# Patient Record
Sex: FEMALE | Race: WHITE | NOT HISPANIC OR LATINO | Employment: STUDENT | URBAN - METROPOLITAN AREA
[De-identification: names, ages, dates, MRNs, and addresses within clinical notes are randomized per-mention and may not be internally consistent; named-entity substitution may affect disease eponyms.]

---
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
## 2023-01-06 ENCOUNTER — PATIENT MESSAGE (OUTPATIENT)
Dept: OBSTETRICS AND GYNECOLOGY | Facility: CLINIC | Age: 18
End: 2023-01-06
Payer: COMMERCIAL

## 2023-01-06 RX ORDER — FLUCONAZOLE 150 MG/1
150 TABLET ORAL DAILY
Qty: 1 TABLET | Refills: 1 | Status: SHIPPED | OUTPATIENT
Start: 2023-01-06 | End: 2023-01-08

## 2024-02-19 RX ORDER — NORETHINDRONE ACETATE AND ETHINYL ESTRADIOL 1MG-20(21)
1 KIT ORAL
Qty: 84 TABLET | Refills: 0 | Status: SHIPPED | OUTPATIENT
Start: 2024-02-19 | End: 2024-05-14

## 2024-02-19 NOTE — TELEPHONE ENCOUNTER
Refill Routing Note   Medication(s) are not appropriate for processing by [facility] for the following reason(s):        Patient not seen by provider within 15 months    [facility] action(s):  Defer               Appointments  past 12m or future 3m with PCP    Date Provider   Last Visit   11/21/2022 Jerrica Martinez MD   Next Visit   Visit date not found Jerrica Martinez MD   ED visits in past 90 days: 0        Note composed:9:58 AM 02/19/2024

## 2024-05-14 RX ORDER — NORETHINDRONE ACETATE AND ETHINYL ESTRADIOL 1MG-20(21)
1 KIT ORAL
Qty: 84 TABLET | Refills: 0 | Status: SHIPPED | OUTPATIENT
Start: 2024-05-14

## 2024-05-14 NOTE — TELEPHONE ENCOUNTER
Refill Routing Note   Medication(s) are not appropriate for processing by [facility] for the following reason(s):        Patient not seen by provider within 15 months    [facility] action(s):  Defer               Appointments  past 12m or future 3m with PCP    Date Provider   Last Visit   11/21/2022 Jerrica Martinez MD   Next Visit   Visit date not found Jerrica Martinez MD   ED visits in past 90 days: 0        Note composed:8:15 AM 05/14/2024

## 2024-05-27 ENCOUNTER — LAB VISIT (OUTPATIENT)
Dept: LAB | Facility: HOSPITAL | Age: 19
End: 2024-05-27
Attending: INTERNAL MEDICINE
Payer: COMMERCIAL

## 2024-05-27 ENCOUNTER — OFFICE VISIT (OUTPATIENT)
Dept: RHEUMATOLOGY | Facility: CLINIC | Age: 19
End: 2024-05-27
Payer: COMMERCIAL

## 2024-05-27 VITALS
DIASTOLIC BLOOD PRESSURE: 77 MMHG | HEART RATE: 70 BPM | SYSTOLIC BLOOD PRESSURE: 119 MMHG | WEIGHT: 111.31 LBS | BODY MASS INDEX: 21.04 KG/M2

## 2024-05-27 DIAGNOSIS — M79.10 MYALGIA: Primary | ICD-10-CM

## 2024-05-27 DIAGNOSIS — M79.10 MYALGIA: ICD-10-CM

## 2024-05-27 LAB
CK SERPL-CCNC: 386 U/L (ref 20–180)
CRP SERPL-MCNC: <0.3 MG/L (ref 0–8.2)
ERYTHROCYTE [SEDIMENTATION RATE] IN BLOOD BY PHOTOMETRIC METHOD: 11 MM/HR (ref 0–36)

## 2024-05-27 PROCEDURE — 3074F SYST BP LT 130 MM HG: CPT | Mod: CPTII,S$GLB,, | Performed by: INTERNAL MEDICINE

## 2024-05-27 PROCEDURE — 3008F BODY MASS INDEX DOCD: CPT | Mod: CPTII,S$GLB,, | Performed by: INTERNAL MEDICINE

## 2024-05-27 PROCEDURE — 36415 COLL VENOUS BLD VENIPUNCTURE: CPT | Performed by: INTERNAL MEDICINE

## 2024-05-27 PROCEDURE — 1159F MED LIST DOCD IN RCRD: CPT | Mod: CPTII,S$GLB,, | Performed by: INTERNAL MEDICINE

## 2024-05-27 PROCEDURE — 1160F RVW MEDS BY RX/DR IN RCRD: CPT | Mod: CPTII,S$GLB,, | Performed by: INTERNAL MEDICINE

## 2024-05-27 PROCEDURE — 82085 ASSAY OF ALDOLASE: CPT | Performed by: INTERNAL MEDICINE

## 2024-05-27 PROCEDURE — 3078F DIAST BP <80 MM HG: CPT | Mod: CPTII,S$GLB,, | Performed by: INTERNAL MEDICINE

## 2024-05-27 PROCEDURE — 86140 C-REACTIVE PROTEIN: CPT | Performed by: INTERNAL MEDICINE

## 2024-05-27 PROCEDURE — 82550 ASSAY OF CK (CPK): CPT | Performed by: INTERNAL MEDICINE

## 2024-05-27 PROCEDURE — 85652 RBC SED RATE AUTOMATED: CPT | Performed by: INTERNAL MEDICINE

## 2024-05-27 PROCEDURE — 99204 OFFICE O/P NEW MOD 45 MIN: CPT | Mod: S$GLB,,, | Performed by: INTERNAL MEDICINE

## 2024-05-27 PROCEDURE — 99999 PR PBB SHADOW E&M-EST. PATIENT-LVL III: CPT | Mod: PBBFAC,,, | Performed by: INTERNAL MEDICINE

## 2024-05-27 RX ORDER — DICLOFENAC SODIUM 10 MG/G
GEL TOPICAL
COMMUNITY
Start: 2024-01-30

## 2024-05-27 RX ORDER — MELOXICAM 15 MG/1
15 TABLET ORAL
COMMUNITY
Start: 2024-01-30

## 2024-05-28 ENCOUNTER — PATIENT MESSAGE (OUTPATIENT)
Dept: RHEUMATOLOGY | Facility: CLINIC | Age: 19
End: 2024-05-28
Payer: COMMERCIAL

## 2024-05-28 NOTE — PROGRESS NOTES
History of present illness:  18-year-old female accompanied by her mother.  She is involved in competitive cheerleading.  She sustained a wrists injury in September.  She had an MRI in March which showed tenosynovitis of the extensor pollicis longus.  She had no evidence of tendon rupture.  She had been treated symptomatically.  Her wrist is doing better.    She went back to training 2 weeks ago.  She complains of muscle soreness since then.  She gets occasional soreness after practice but nothing like this.  It was 1 of her harder practices.  The pain remain bad for a week.  It is starting to wear off but it is still there.  She had no antecedent URI or vaccine.  The pain is bad in the morning.  It is worse with activity.  It occasionally wakes her up at night.  She still continues to train.  She denies any muscle weakness.  She has no Coca-Cola colored urine.  Other chair L have had episodes of rhabdo during the summer.    She also has a 5 year history of neck and back problems.  The pain is intermittent.  The pain is bad in the morning.  She has 30 minutes of morning stiffness.  She has no radicular pain.  She has no other peripheral joint complaints.    She has been using heat by soaking in a tub.  Ibuprofen did not help.  Topical medicines give her some relief but she is sensitive to touch when her mother tries to rub it in.    No fever or headache.  She had an episode of erythema on her eyelid.  She has had no conjunctivitis, oral ulcers, dry eye or mouth, dysphagia, Raynaud's phenomena, pleurisy, vaginal discharge or ulcers, chronic or bloody diarrhea.  She has no thrombophlebitis.  She has no numbness or tingling.  Her grandmother has osteoarthritis in her mother has knee problems.    Systems review:   General:  Weight has been stable   GI: No abdominal pain or peptic ulcer disease.  No liver problems.    : No kidney or bladder problems   Hematology: She had iron-deficiency anemia.  This resolved with iron  supplements.    Physical examination:  Musculoskeletal:  Cervical spine is unremarkable.    She has tenderness along the left shoulder girdle.  She is full range of motion of the shoulders.    Elbows, wrists, small joints of the hands are unremarkable.  She has pain on extension of the lumbar spine.  She is good range of motion lumbar spine.  She has tenderness of the left lateral hip.  She is full range of motion of both hips.  Knees, ankles, small joints the feet are unremarkable.  Neurologic: Normal muscle strength testing    Assessment:  She has a 2 week history of diffuse myalgias.  She has no obvious muscle weakness.  She has a lot of areas of allodynia.    Plans:   1. Laboratory studies obtained   2. If her symptoms persist, she may need to see PM&R   3.  I did not give her regular return appointment.  It depends on the laboratory studies.    Answers submitted by the patient for this visit:  Rheumatology Questionnaire (Submitted on 5/27/2024)  fever: No  eye redness: No  mouth sores: No  headaches: No  shortness of breath: No  chest pain: No  trouble swallowing: No  diarrhea: No  constipation: Yes  unexpected weight change: No  genital sore: No  dysuria: No  During the last 3 days, have you had a skin rash?: Yes  Bruises or bleeds easily: No  cough: No

## 2024-05-30 LAB — ALDOLASE SERPL-CCNC: 4.4 U/L (ref 1.2–7.6)

## 2024-06-07 ENCOUNTER — PATIENT MESSAGE (OUTPATIENT)
Dept: RHEUMATOLOGY | Facility: CLINIC | Age: 19
End: 2024-06-07
Payer: COMMERCIAL

## 2024-06-07 DIAGNOSIS — M79.10 MYALGIA: Primary | ICD-10-CM

## 2024-06-11 ENCOUNTER — LAB VISIT (OUTPATIENT)
Dept: LAB | Facility: HOSPITAL | Age: 19
End: 2024-06-11
Attending: INTERNAL MEDICINE
Payer: COMMERCIAL

## 2024-06-11 ENCOUNTER — PATIENT MESSAGE (OUTPATIENT)
Dept: RHEUMATOLOGY | Facility: CLINIC | Age: 19
End: 2024-06-11
Payer: COMMERCIAL

## 2024-06-11 DIAGNOSIS — M79.10 MYALGIA: ICD-10-CM

## 2024-06-11 LAB — CK SERPL-CCNC: 69 U/L (ref 20–180)

## 2024-06-11 PROCEDURE — 36415 COLL VENOUS BLD VENIPUNCTURE: CPT | Performed by: INTERNAL MEDICINE

## 2024-06-11 PROCEDURE — 82550 ASSAY OF CK (CPK): CPT | Performed by: INTERNAL MEDICINE

## 2024-08-05 ENCOUNTER — OFFICE VISIT (OUTPATIENT)
Dept: OBSTETRICS AND GYNECOLOGY | Facility: CLINIC | Age: 19
End: 2024-08-05
Payer: COMMERCIAL

## 2024-08-05 VITALS — BODY MASS INDEX: 21.97 KG/M2 | WEIGHT: 116.38 LBS | HEIGHT: 61 IN

## 2024-08-05 DIAGNOSIS — Z11.3 SCREENING FOR STD (SEXUALLY TRANSMITTED DISEASE): ICD-10-CM

## 2024-08-05 DIAGNOSIS — Z01.419 ENCOUNTER FOR GYNECOLOGICAL EXAMINATION: Primary | ICD-10-CM

## 2024-08-05 PROCEDURE — 87491 CHLMYD TRACH DNA AMP PROBE: CPT | Performed by: OBSTETRICS & GYNECOLOGY

## 2024-08-05 PROCEDURE — 87591 N.GONORRHOEAE DNA AMP PROB: CPT | Performed by: OBSTETRICS & GYNECOLOGY

## 2024-08-05 PROCEDURE — 3008F BODY MASS INDEX DOCD: CPT | Mod: CPTII,S$GLB,, | Performed by: OBSTETRICS & GYNECOLOGY

## 2024-08-05 PROCEDURE — 99395 PREV VISIT EST AGE 18-39: CPT | Mod: S$GLB,,, | Performed by: OBSTETRICS & GYNECOLOGY

## 2024-08-05 PROCEDURE — 99999 PR PBB SHADOW E&M-EST. PATIENT-LVL III: CPT | Mod: PBBFAC,,, | Performed by: OBSTETRICS & GYNECOLOGY

## 2024-08-05 PROCEDURE — 1159F MED LIST DOCD IN RCRD: CPT | Mod: CPTII,S$GLB,, | Performed by: OBSTETRICS & GYNECOLOGY

## 2024-08-05 PROCEDURE — 99459 PELVIC EXAMINATION: CPT | Mod: S$GLB,,, | Performed by: OBSTETRICS & GYNECOLOGY

## 2024-08-05 RX ORDER — NORETHINDRONE ACETATE AND ETHINYL ESTRADIOL 1MG-20(21)
1 KIT ORAL DAILY
Qty: 84 TABLET | Refills: 3 | Status: SHIPPED | OUTPATIENT
Start: 2024-08-05

## 2024-08-06 LAB
C TRACH DNA SPEC QL NAA+PROBE: DETECTED
N GONORRHOEA DNA SPEC QL NAA+PROBE: NOT DETECTED

## 2024-08-07 ENCOUNTER — PATIENT MESSAGE (OUTPATIENT)
Dept: OBSTETRICS AND GYNECOLOGY | Facility: CLINIC | Age: 19
End: 2024-08-07
Payer: COMMERCIAL

## 2024-08-07 RX ORDER — AZITHROMYCIN 500 MG/1
TABLET, FILM COATED ORAL
Qty: 4 TABLET | Refills: 0 | Status: SHIPPED | OUTPATIENT
Start: 2024-08-07

## 2024-08-09 ENCOUNTER — TELEPHONE (OUTPATIENT)
Dept: OBSTETRICS AND GYNECOLOGY | Facility: CLINIC | Age: 19
End: 2024-08-09
Payer: COMMERCIAL

## 2024-08-12 ENCOUNTER — PATIENT MESSAGE (OUTPATIENT)
Dept: OBSTETRICS AND GYNECOLOGY | Facility: CLINIC | Age: 19
End: 2024-08-12
Payer: COMMERCIAL

## 2024-09-30 ENCOUNTER — PATIENT MESSAGE (OUTPATIENT)
Dept: OBSTETRICS AND GYNECOLOGY | Facility: CLINIC | Age: 19
End: 2024-09-30
Payer: COMMERCIAL

## 2024-09-30 DIAGNOSIS — Z86.19 HISTORY OF CHLAMYDIA: Primary | ICD-10-CM

## 2024-10-04 ENCOUNTER — LAB VISIT (OUTPATIENT)
Dept: LAB | Facility: HOSPITAL | Age: 19
End: 2024-10-04
Attending: OBSTETRICS & GYNECOLOGY
Payer: COMMERCIAL

## 2024-10-04 DIAGNOSIS — Z86.19 HISTORY OF CHLAMYDIA: ICD-10-CM

## 2024-10-04 PROCEDURE — 87591 N.GONORRHOEAE DNA AMP PROB: CPT | Performed by: OBSTETRICS & GYNECOLOGY

## 2024-10-04 PROCEDURE — 87491 CHLMYD TRACH DNA AMP PROBE: CPT | Performed by: OBSTETRICS & GYNECOLOGY

## 2024-10-07 LAB
C TRACH DNA SPEC QL NAA+PROBE: NOT DETECTED
N GONORRHOEA DNA SPEC QL NAA+PROBE: NOT DETECTED

## 2024-10-31 ENCOUNTER — PATIENT MESSAGE (OUTPATIENT)
Dept: RHEUMATOLOGY | Facility: CLINIC | Age: 19
End: 2024-10-31
Payer: COMMERCIAL

## 2024-10-31 DIAGNOSIS — M79.10 MYALGIA: Primary | ICD-10-CM

## 2024-11-01 ENCOUNTER — LAB VISIT (OUTPATIENT)
Dept: LAB | Facility: HOSPITAL | Age: 19
End: 2024-11-01
Attending: INTERNAL MEDICINE
Payer: COMMERCIAL

## 2024-11-01 DIAGNOSIS — M79.10 MYALGIA: ICD-10-CM

## 2024-11-01 LAB
BASOPHILS # BLD AUTO: 0.06 K/UL (ref 0–0.2)
BASOPHILS NFR BLD: 1.2 % (ref 0–1.9)
DIFFERENTIAL METHOD BLD: ABNORMAL
EOSINOPHIL # BLD AUTO: 0.1 K/UL (ref 0–0.5)
EOSINOPHIL NFR BLD: 1.4 % (ref 0–8)
ERYTHROCYTE [DISTWIDTH] IN BLOOD BY AUTOMATED COUNT: 12.4 % (ref 11.5–14.5)
HCT VFR BLD AUTO: 41.9 % (ref 37–48.5)
HGB BLD-MCNC: 13.6 G/DL (ref 12–16)
IMM GRANULOCYTES # BLD AUTO: 0.01 K/UL (ref 0–0.04)
IMM GRANULOCYTES NFR BLD AUTO: 0.2 % (ref 0–0.5)
LYMPHOCYTES # BLD AUTO: 1.5 K/UL (ref 1–4.8)
LYMPHOCYTES NFR BLD: 30.5 % (ref 18–48)
MCH RBC QN AUTO: 31.5 PG (ref 27–31)
MCHC RBC AUTO-ENTMCNC: 32.5 G/DL (ref 32–36)
MCV RBC AUTO: 97 FL (ref 82–98)
MONOCYTES # BLD AUTO: 0.3 K/UL (ref 0.3–1)
MONOCYTES NFR BLD: 6 % (ref 4–15)
NEUTROPHILS # BLD AUTO: 3 K/UL (ref 1.8–7.7)
NEUTROPHILS NFR BLD: 60.7 % (ref 38–73)
NRBC BLD-RTO: 0 /100 WBC
PLATELET # BLD AUTO: 256 K/UL (ref 150–450)
PMV BLD AUTO: 9.6 FL (ref 9.2–12.9)
RBC # BLD AUTO: 4.32 M/UL (ref 4–5.4)
WBC # BLD AUTO: 4.99 K/UL (ref 3.9–12.7)

## 2024-11-01 PROCEDURE — 85025 COMPLETE CBC W/AUTO DIFF WBC: CPT | Performed by: INTERNAL MEDICINE

## 2024-11-01 PROCEDURE — 36415 COLL VENOUS BLD VENIPUNCTURE: CPT | Performed by: INTERNAL MEDICINE

## 2025-08-07 ENCOUNTER — OFFICE VISIT (OUTPATIENT)
Dept: OBSTETRICS AND GYNECOLOGY | Facility: CLINIC | Age: 20
End: 2025-08-07
Payer: COMMERCIAL

## 2025-08-07 VITALS
DIASTOLIC BLOOD PRESSURE: 74 MMHG | WEIGHT: 117.06 LBS | SYSTOLIC BLOOD PRESSURE: 112 MMHG | HEIGHT: 61 IN | BODY MASS INDEX: 22.1 KG/M2

## 2025-08-07 DIAGNOSIS — Z01.419 ENCOUNTER FOR GYNECOLOGICAL EXAMINATION: Primary | ICD-10-CM

## 2025-08-07 DIAGNOSIS — Z11.3 SCREENING FOR STD (SEXUALLY TRANSMITTED DISEASE): ICD-10-CM

## 2025-08-07 DIAGNOSIS — Z12.4 SCREENING FOR CERVICAL CANCER: ICD-10-CM

## 2025-08-07 PROCEDURE — 87591 N.GONORRHOEAE DNA AMP PROB: CPT | Performed by: OBSTETRICS & GYNECOLOGY

## 2025-08-07 PROCEDURE — 99999 PR PBB SHADOW E&M-EST. PATIENT-LVL II: CPT | Mod: PBBFAC,,, | Performed by: OBSTETRICS & GYNECOLOGY

## 2025-08-07 RX ORDER — NORETHINDRONE ACETATE AND ETHINYL ESTRADIOL 1MG-20(21)
1 KIT ORAL DAILY
Qty: 84 TABLET | Refills: 3 | Status: SHIPPED | OUTPATIENT
Start: 2025-08-07

## 2025-08-07 NOTE — PROGRESS NOTES
"Subjective:       Patient ID: Malina Aguilar is a 19 y.o. female.    Chief Complaint:  Annual Exam (Annual exam )      History of Present Illness  - here for annual. No complaints. Doing well on ocps.    Past Medical History:   Diagnosis Date    ADHD     Anxiety        Past Surgical History:   Procedure Laterality Date    TYMPANOSTOMY TUBE PLACEMENT          Family History   Problem Relation Name Age of Onset    Hypertension Paternal Grandmother      Stroke Paternal Grandmother      Hypertension Maternal Grandmother      Hypertension Maternal Grandfather      Hypertension Mother      Breast cancer Paternal Aunt      Cancer Paternal Aunt      Kidney disease Father      Hypertension Maternal Uncle      Hypertension Maternal Aunt      Colon cancer Neg Hx      Diabetes Neg Hx      Ovarian cancer Neg Hx          Social History[1]        Objective:     Vitals:    08/07/25 1537   BP: 112/74   Weight: 53.1 kg (117 lb 1 oz)   Height: 5' 1" (1.549 m)       Physical Exam:   Constitutional: She is oriented to person, place, and time. She appears well-developed and well-nourished.        Pulmonary/Chest: Right breast exhibits no mass, no nipple discharge, no skin change, no tenderness and no swelling. Left breast exhibits no mass, no nipple discharge, no skin change, no tenderness and no swelling. Breasts are symmetrical.        Abdominal: Soft. She exhibits no distension. There is no abdominal tenderness.     Genitourinary:    Vagina and uterus normal.   There is no tenderness or lesion on the right labia. There is no tenderness or lesion on the left labia. Cervix is normal. Right adnexum displays no mass, no tenderness and no fullness. Left adnexum displays no mass, no tenderness and no fullness. No vaginal discharge in the vagina.    pap smear completed          Musculoskeletal: Moves all extremeties.       Neurological: She is alert and oriented to person, place, and time.     Psychiatric: She has a normal mood and affect. "        A female chaperone was present for exam.    Assessment/ Plan:     Orders Placed This Encounter    C. trachomatis/N. gonorrhoeae by AMP [facility]; Vagina    Liquid-Based Pap Smear, Screening    norethindrone-ethinyl estradiol (BLISOVI FE 1/20, 28,) 1 mg-20 mcg (21)/75 mg (7) per tablet       Malina was seen today for annual exam.    Diagnoses and all orders for this visit:    Encounter for gynecological examination    Screening for cervical cancer  -     Liquid-Based Pap Smear, Screening    Screening for STD (sexually transmitted disease)  -     C. trachomatis/N. gonorrhoeae by [facility]; Vagina    Other orders  -     norethindrone-ethinyl estradiol (BLISOVI FE 1/20, 28,) 1 mg-20 mcg (21)/75 mg (7) per tablet; Take 1 tablet by mouth once daily.        Follow up in about 1 year (around 8/7/2026) for annual exam.    As of April 1, 2021, the Cures Act has been passed nationally. This new law requires that all doctors progress notes, lab results, pathology reports and radiology reports be released IMMEDIATELY to the patient in the patient portal. That means that the results are released to you at the EXACT same time they are released to me. Therefore, with all of the patients that I have I am not able to reply to each patient exactly when the results come in. So there will be a delay from when you see the results to when I see them and have time to come up with a response to send you. Also I only see these results when I am on the computer at work. So if the results come in over the weekend or after 5 pm of a work day, I will not see them until the next business day. As you can tell, this is a challenge as a physician to give every patient the quick response they hope for and deserve. So please be patient!   Thanks for your understanding and patience.         [1]   Social History  Socioeconomic History    Marital status: Single   Tobacco Use    Smoking status: Never    Smokeless tobacco: Never   Substance  and Sexual Activity    Alcohol use: Yes     Comment: rare    Drug use: Never    Sexual activity: Yes     Partners: Male     Birth control/protection: Condom     Social Drivers of Health     Financial Resource Strain: Low Risk  (5/27/2024)    Overall Financial Resource Strain (CARDIA)     Difficulty of Paying Living Expenses: Not hard at all   Food Insecurity: No Food Insecurity (5/27/2024)    Hunger Vital Sign     Worried About Running Out of Food in the Last Year: Never true     Ran Out of Food in the Last Year: Never true   Physical Activity: Sufficiently Active (5/27/2024)    Exercise Vital Sign     Days of Exercise per Week: 6 days     Minutes of Exercise per Session: 60 min   Stress: No Stress Concern Present (5/27/2024)    [employer] - Occupational Stress Questionnaire     Feeling of Stress : Only a little   Housing Stability: Unknown (5/27/2024)    Housing Stability Vital Sign     Unable to Pay for Housing in the Last Year: No

## 2025-08-09 LAB
C TRACH DNA SPEC QL NAA+PROBE: NOT DETECTED
CTGC SOURCE (OHS) ORD-325: NORMAL
N GONORRHOEA DNA UR QL NAA+PROBE: NOT DETECTED